# Patient Record
Sex: MALE | Race: WHITE | Employment: FULL TIME | URBAN - METROPOLITAN AREA
[De-identification: names, ages, dates, MRNs, and addresses within clinical notes are randomized per-mention and may not be internally consistent; named-entity substitution may affect disease eponyms.]

---

## 2018-12-04 ENCOUNTER — TRANSFERRED RECORDS (OUTPATIENT)
Dept: HEALTH INFORMATION MANAGEMENT | Facility: CLINIC | Age: 28
End: 2018-12-04

## 2019-02-20 NOTE — PROGRESS NOTES
SUBJECTIVE:   Mina Bar is a 29 year old male who presents to clinic today for the following health issues:      New Patient/Transfer of Care    Abnormal Mood Symptoms      Duration: on going 1+ year    Description:  Depression: no   Anxiety: YES  Panic attacks: YES - has had them. But not often    Accompanying signs and symptoms: see PHQ-9 and JEAN-PIERRE scores    History (similar episodes/previous evaluation): None    Precipitating or alleviating factors: None    Therapies tried and outcome: has seen someone before (5-6times) at [facility] 4/5 months ago but it wasn't working out well.     Patient states he was diagnosed with anxiety about a year or so ago. Was being seen through [facility] in [place]. Did not feel therapy was helpful with this provider. Was referred by a friend to our therapist Amberly Lu. He needed a PCP to refer him and this is the primary reason for his appointment .    He states his anxiety is generalized. Sleeps is hit or miss. Some night little to none, others is fine. Has frequent bad dreams. Denies history of trauma or abuse today. Appetite is okay. Some panic type symptoms, happens often. Has never been on medications. Open to this idea, but primarily looking for therapy.     Pt drinks several beers a week. No other drugs. Does not smoke. FH of anxiety is his mother, but he is not sure if she has been diagnosed. No other FH of mental health issues like bipolar disorder, etc.       Problem list and histories reviewed & adjusted, as indicated.  Additional history: as documented    Labs reviewed in EPIC    Reviewed and updated as needed this visit by clinical staff  Tobacco  Allergies  Meds  Problems  Med Hx  Surg Hx  Fam Hx  Soc Hx        Reviewed and updated as needed this visit by Provider  Tobacco  Allergies  Meds  Problems  Med Hx  Surg Hx  Fam Hx         ROS:  Constitutional, HEENT, cardiovascular, pulmonary, gi and gu systems are negative, except as otherwise  "noted.    OBJECTIVE:     /74   Pulse 79   Temp 97.9  F (36.6  C) (Tympanic)   Resp 14   Ht 1.854 m (6' 1\")   Wt 99.8 kg (220 lb)   SpO2 98%   BMI 29.03 kg/m    Body mass index is 29.03 kg/m .  GENERAL: healthy, alert and no distress  RESP: lungs clear to auscultation - no rales, rhonchi or wheezes  CV: regular rate and rhythm, normal S1 S2, no S3 or S4, no murmur, click or rub, no peripheral edema  NEURO: Normal strength and tone, mentation intact and speech normal  PSYCH: mentation appears normal, affect normal/bright    Diagnostic Test Results:    ASSESSMENT/PLAN:     JEAN-PIERRE (generalized anxiety disorder) / Panic disorder without agoraphobia  Patient has appt with therapy now set up. Labs today. He will consider medication and discuss further with therapist. Call or make appt to discuss further if he feels this is something he is interested in or needs.   - TSH with free T4 reflex  - Vitamin D Deficiency  - Vitamin B12    Radha Holland MD  [facility] [place]  "

## 2019-02-25 ENCOUNTER — DOCUMENTATION ONLY (OUTPATIENT)
Dept: PSYCHOLOGY | Facility: OTHER | Age: 29
End: 2019-02-25
Payer: COMMERCIAL

## 2019-02-25 DIAGNOSIS — Z53.9 NO SHOW: Primary | ICD-10-CM

## 2019-02-27 ENCOUNTER — OFFICE VISIT (OUTPATIENT)
Dept: FAMILY MEDICINE | Facility: OTHER | Age: 29
End: 2019-02-27
Attending: FAMILY MEDICINE
Payer: COMMERCIAL

## 2019-02-27 ENCOUNTER — OFFICE VISIT (OUTPATIENT)
Dept: BEHAVIORAL HEALTH | Facility: OTHER | Age: 29
End: 2019-02-27
Attending: SOCIAL WORKER
Payer: COMMERCIAL

## 2019-02-27 VITALS
HEIGHT: 73 IN | BODY MASS INDEX: 29.16 KG/M2 | SYSTOLIC BLOOD PRESSURE: 118 MMHG | HEART RATE: 79 BPM | WEIGHT: 220 LBS | TEMPERATURE: 97.9 F | RESPIRATION RATE: 14 BRPM | OXYGEN SATURATION: 98 % | DIASTOLIC BLOOD PRESSURE: 74 MMHG

## 2019-02-27 DIAGNOSIS — F41.1 GAD (GENERALIZED ANXIETY DISORDER): Primary | ICD-10-CM

## 2019-02-27 DIAGNOSIS — F41.0 PANIC DISORDER WITHOUT AGORAPHOBIA: ICD-10-CM

## 2019-02-27 DIAGNOSIS — R69 DIAGNOSIS DEFERRED: Primary | ICD-10-CM

## 2019-02-27 LAB
TSH SERPL DL<=0.005 MIU/L-ACNC: 1.42 MU/L (ref 0.4–4)
VIT B12 SERPL-MCNC: 381 PG/ML (ref 193–986)

## 2019-02-27 PROCEDURE — 99000 SPECIMEN HANDLING OFFICE-LAB: CPT | Performed by: FAMILY MEDICINE

## 2019-02-27 PROCEDURE — 36415 COLL VENOUS BLD VENIPUNCTURE: CPT | Performed by: FAMILY MEDICINE

## 2019-02-27 PROCEDURE — 99213 OFFICE O/P EST LOW 20 MIN: CPT | Performed by: FAMILY MEDICINE

## 2019-02-27 PROCEDURE — 82607 VITAMIN B-12: CPT | Mod: 90 | Performed by: FAMILY MEDICINE

## 2019-02-27 PROCEDURE — 82306 VITAMIN D 25 HYDROXY: CPT | Mod: 90 | Performed by: FAMILY MEDICINE

## 2019-02-27 PROCEDURE — 84443 ASSAY THYROID STIM HORMONE: CPT | Performed by: FAMILY MEDICINE

## 2019-02-27 PROCEDURE — 99207 ZZC NO CHARGE BEHAVIORAL WARM HANDOFF: CPT | Performed by: SOCIAL WORKER

## 2019-02-27 ASSESSMENT — PAIN SCALES - GENERAL: PAINLEVEL: NO PAIN (0)

## 2019-02-27 ASSESSMENT — MIFFLIN-ST. JEOR: SCORE: 2011.79

## 2019-02-27 NOTE — NURSING NOTE
"Chief Complaint   Patient presents with     Establish Care       Initial /74   Pulse 79   Temp 97.9  F (36.6  C) (Tympanic)   Resp 14   Ht 1.854 m (6' 1\")   Wt 99.8 kg (220 lb)   SpO2 98%   BMI 29.03 kg/m   Estimated body mass index is 29.03 kg/m  as calculated from the following:    Height as of this encounter: 1.854 m (6' 1\").    Weight as of this encounter: 99.8 kg (220 lb).  Medication Reconciliation: complete    Susan Mercer LPN  "

## 2019-02-27 NOTE — PROGRESS NOTES
BEHAVIORAL HEALTH CLINICIAN Warm-handoff note    February 27, 2019    BEHAVIORAL HEALTH CLINICIAN introduced and explained integrated health model, brief therapy interventions and referral and support services for ongoing therapy interventions.  Explained the role of the [facility] and provided informational handout and contact information for the [facility]. Presenting Issue: Anxiety.  States he saw a therapist previously to address his anxiety.  He states that the therapist was using mindfulness based coping skills which helped him minimally, was looking for other skills however therapist was not helping him very much with that.  He states that a friend had recently seen [facility] and recommended he come in to get connected.  Made a PCP appointment, as he has not established care with anyone in clinic.  He is willing to establish with Dr. Holland.  Scheduled intake appointment for next week.  Patient states that he rarely feels depressed, suicidality has never been a problem for him.    ANOOP Brown, LICSW, [facility]

## 2019-02-28 LAB — DEPRECATED CALCIDIOL+CALCIFEROL SERPL-MC: 8 UG/L (ref 20–75)

## 2019-03-01 DIAGNOSIS — E55.9 VITAMIN D DEFICIENCY: Primary | ICD-10-CM

## 2019-03-01 RX ORDER — ERGOCALCIFEROL 1.25 MG/1
50000 CAPSULE, LIQUID FILLED ORAL WEEKLY
Qty: 8 CAPSULE | Refills: 0 | Status: SHIPPED | OUTPATIENT
Start: 2019-03-01 | End: 2019-04-20

## 2019-03-15 ENCOUNTER — OFFICE VISIT (OUTPATIENT)
Dept: BEHAVIORAL HEALTH | Facility: OTHER | Age: 29
End: 2019-03-15
Attending: SOCIAL WORKER
Payer: COMMERCIAL

## 2019-03-15 DIAGNOSIS — F41.0 PANIC ATTACK: ICD-10-CM

## 2019-03-15 DIAGNOSIS — F43.21 ADJUSTMENT DISORDER WITH DEPRESSED MOOD: ICD-10-CM

## 2019-03-15 DIAGNOSIS — F41.1 GAD (GENERALIZED ANXIETY DISORDER): ICD-10-CM

## 2019-03-15 PROCEDURE — 90791 PSYCH DIAGNOSTIC EVALUATION: CPT | Performed by: SOCIAL WORKER

## 2019-03-15 ASSESSMENT — ANXIETY QUESTIONNAIRES
3. WORRYING TOO MUCH ABOUT DIFFERENT THINGS: MORE THAN HALF THE DAYS
6. BECOMING EASILY ANNOYED OR IRRITABLE: SEVERAL DAYS
7. FEELING AFRAID AS IF SOMETHING AWFUL MIGHT HAPPEN: SEVERAL DAYS
GAD7 TOTAL SCORE: 12
IF YOU CHECKED OFF ANY PROBLEMS ON THIS QUESTIONNAIRE, HOW DIFFICULT HAVE THESE PROBLEMS MADE IT FOR YOU TO DO YOUR WORK, TAKE CARE OF THINGS AT HOME, OR GET ALONG WITH OTHER PEOPLE: VERY DIFFICULT
1. FEELING NERVOUS, ANXIOUS, OR ON EDGE: MORE THAN HALF THE DAYS
5. BEING SO RESTLESS THAT IT IS HARD TO SIT STILL: MORE THAN HALF THE DAYS
2. NOT BEING ABLE TO STOP OR CONTROL WORRYING: MORE THAN HALF THE DAYS

## 2019-03-15 ASSESSMENT — PATIENT HEALTH QUESTIONNAIRE - PHQ9
5. POOR APPETITE OR OVEREATING: MORE THAN HALF THE DAYS
SUM OF ALL RESPONSES TO PHQ QUESTIONS 1-9: 13

## 2019-03-15 NOTE — PROGRESS NOTES
"Diagnostic Assessment     [facility]  Integrated Behavioral Health Services    Patient: Mina Bar MRN: [id number] ACCOUNT NUMBER: [id number]  : 1990   Age: 29 year old   Sex: male     Phone:  Home number on file: [phone] (home)    Work number on file:  There is no work phone number on file.    Cell number on file:       Telephone Information:   Mobile [phone]        Interview Date: 3/15/19   SERVICE LOCATION: Face to Face in Clinic  Visit Activities: NEW and [facility] Only    Identifying Information:  Patient is a 29 year old year old, , single male.  Patient attended the session alone. Patient presented as cooperative, calm, appropriate.    Referral:  Mina  was referred for an assessment by self.   Reason for referral: clarify behavioral health diagnosis, determine behavioral health treatment options and assess client readiness and motivation to change.     Patient's Statement of Presenting Concern & Functional impairments:  Mina reports the following reason(s) for seeking an assessment at this time: wants to address anxiety and lack of motivation symptoms.  When I get home, I just sit there and don't want to do anything.  \"I want to do things, but don't have any ambition to do it.\"  his symptoms have resulted in the following functional impairments: childcare / parenting, management of the household and or completion of tasks, organization, relationship(s), self-care and social interactions    History of Presenting Concern:  Mina  reports that these problem(s) began for many years.  Was a loner, would come home from school and be with parents/ grandparents.  Didn't have many friends, was the \"popular loner.\"  The worse of the symptoms have started a year ago.  The increase in anhedonia started only a couple months ago. He also indicates that his anxiety always gets worse at night.  Will have panic attacks, has only happened at night, does not remember one " that has happened in the day time.  Also states that he feels affected by the seasonal changes and this time of year is always more difficult for him.  he has received the following mental health services in the past: counseling and physician / PCP.     Social History:  Mina  reports he grew up in [place], [place].  Moved here with family in .  Mina has two half sisters.  Doesn't have a relationship with either one of them.  Mina describes his childhood as good.  Grew up with mom, then moved in with dad off and on.  Was close with grandparents and cousins.  They were together a lot.  Grew up in a blended family.  Family was all they ever hung out with.  Mina describes his current relationships with his family of origin as good.  Both parents still living, were , got  for 10 years then re-.  Both grandfathers .  Still struggles with these losses.    Mina identifies his relationship status as: , currently has a girlfirend.  in ,  in .   Still able to talk with ex and have a civil relationship.  Current relationship with girlfriend is supportive and healthy.  Mina identifies his sexual orientation as: opposite sex   Mina denies sexual health concerns.     Mina reports having two children. Troy, 10 year old.  Mina, 9 year old- live with their mom in [place].  See's them every other weekend.  Can see them whenever he wants.      Mina describes the quantity/quality of his social relationships as minimal.  He has acquaintances, but doesn't get close with many.  Hangs out with a select group of people, namely his girlfriend and cousins.  Mina denies personal  experience.     Mina  has not been involved with the legal system.    Mina highest education level was high school graduate and currently in school- takes about two courses each semester for general AA.. Mina  did not identify any learning problems. There are no  ethnic, cultural or Mosque factors that may be relevant for therapy.     Mina lives alone.  In rental house.  Currently employed full time.  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    Mental Health History:  Mina reported the following biological family members or relatives with mental health issues: Mother experienced Anxiety.  he previously received the following mental health diagnosis: saw someone previously, but was never told what his diagnosis were.   Hospitalizations: None.  he is not currently receiving any mental health services    Chemical Health History:  Mina  reported the following biological family members or relatives with chemical health issues: Uncle reportedly used alcohol . he has not received chemical dependency treatment in the past. he is not currently receiving any chemical dependency treatment. he reports no problems as a result of their drinking / drug use.    CAGE: None of the patient's responses to the CAGE screening were positive / Negative CAGE score Based on the negative Cage-Aid score and clinical interview there  are not indications of drug or alcohol abuse.     Discussed the general effects of drugs and alcohol on health and well-being.    Client Reports:  Mina reports using alcohol 2 times per month and has 1 beers and mixed drinks at a time. Client first started drinking at age 20.  Mina denies using tobacco.  Mina denies using marijuana.  Mina denies using caffeine.  Trying to cut down caffeine, hasn't had any in a couple days.  Used to drink caffeine beverages every day.  Mina denies using street drugs.  Mina denies the non-medical use of prescription or over the counter drugs.    Significant Losses / Trauma / Abuse / Neglect Issues / Developmental Incidents:  Mina reports significant loss/trauma/abuse/neglect issues/developmental incidents.  Mina reports death of grandparents who both  6 months ago only a  few weeks apart from each other.  First one  sept 1 second one  sept 26.  Cousin also  4-5 years, but thinks about that daily.   due to cancer at 29.   Mina has not addressed the above concerns in previous therapy/treatment.    Patient's Strengths and Limitations:   Mina identified the following strengths or resources that will help him cope: caring, committed to sobriety, employed, goal-focused, good listener, has a previous history of therapy, open to learning, open to suggestions / feedback, responsible parent, support of family, friends and providers, supportive, wants to learn and willing to relate to others  commitment to health and well being, friends / good social support and family support. Things that may interfere with the patient's functional status include:none indicated.    Medical History:   There is no problem list on file for this patient.      Medication Review:  Current Outpatient Medications   Medication     vitamin D2 (ERGOCALCIFEROL) 93274 units (1250 mcg) capsule     No current facility-administered medications for this visit.        Patient was provided recommendation to follow-up with physician as needed.    Medication Adherence:  N/A - Client does not have prescribed psychiatric medications.    Clinical Findings    Mental Status Assessment:    Appearance:   Appropriate   Eye Contact:   Good   Psychomotor Behavior: Normal   Attitude:   Cooperative   Orientation:   All  Speech   Rate / Production: Normal    Volume:  Normal   Mood:    Anxious  Normal  Affect:    Appropriate   Thought Content:  Clear   Thought Form:  Coherent  Logical   Insight:    Good     These cognitive functions grossly appear as described, but were not formally tested.    Review of Symptoms:  Depression: Sleep Interest Energy Concentration Appetite Ruminations  Arpita:  No symptoms  Psychosis: No symptoms  Anxiety: Worries  Panic:  Tremors Shortness of Breath Sense of Impending Doom pacing clamy  hot  Post Traumatic Stress Disorder: No symptoms  Obsessive Compulsive Disorder: No symptoms  Eating Disorder: No symptoms    Safety Issues and Plan for Safety and Risk Management:  Patient denies a history of suicidal ideation, suicide attempts, self-injurious behavior, homicidal ideation, homicidal behavior and and other safety concerns  Patient denies current fears or concerns for personal safety.  Patient denies current or recent suicidal ideation or behaviors.  Patient denies current or recent homicidal ideation or behaviors.  Patient denies current or recent self injurious behavior or ideation.  Patient denies other safety concerns.  Patient reports there are firearms in the house. The firearms are secured in a locked space  Protective Factors Children in the home , Sense of responsibility to family, Life Satisfaction, Reality testing ability, Positive coping skills, Positive problem-solving skills, Positive social support and Positive therapeutic releationships   A safety and risk management plan has not been developed at this time, however client was given the after-hours number / 911 should there be a change in any of these risk factors.    Diagnostic Criteria:  A. Excessive anxiety and worry about a number of events or activities (such as work or school performance).   B. The person finds it difficult to control the worry.  C. Select 3 or more symptoms (required for diagnosis). Only one item is required in children.   - Restlessness or feeling keyed up or on edge.    - Being easily fatigued.    - Difficulty concentrating or mind going blank.    - Sleep disturbance (difficulty falling or staying asleep, or restless unsatisfying sleep).   D. The focus of the anxiety and worry is not confined to features of an Axis I disorder.  E. The anxiety, worry, or physical symptoms cause clinically significant distress or impairment in social, occupational, or other important areas of functioning.   F. The disturbance  is not due to the direct physiological effects of a substance (e.g., a drug of abuse, a medication) or a general medical condition (e.g., hyperthyroidism) and does not occur exclusively during a Mood Disorder, a Psychotic Disorder, or a Pervasive Developmental Disorder.  1. Recurrent unexpected panic attacks and meets criteria 2, 3, and 4 (below)  2. At least one of the attacks has been followed by 1 month (or more) of one (or more) of the following:     (a) persistent concern about having additional attacks     (c) a significant change in behavior related to the attacks  3. Absence of agoraphobia  4. The panic attacks are not to the the direct physiological effects of a substance or general medical condition  5. The panic attacks are not better accounted for by another mental disorder, such as social phobia, specific phobia, OCD, PTSD, or separation anxiety disorder  A. The development of emotional or behavioral symptoms in response to an identifiable stressor(s) occurring within 3 months of the onset of the stressor(s)  B. These symptoms or behaviors are clinically significant, as evidenced by one or both of the following:       - Marked distress that is out of proportion to the severity/intensity of the stressor (with consideration for external context & culture)  C. The stress-related disturbance does not meet criteria for another disorder & is not not an exacerbation of another mental disorder  D. The symptoms do not represent normal bereavement  E. Once the stressor or its consequences have terminated, the symptoms do not persist for more than an additional 6 months       * Adjustment Disorder with Mixed Anxiety and Depressed Mood: The predominant manifestation is a combination of depression and anxiety    DSM5 Diagnoses: (Sustained by DSM5 Criteria Listed Above)  Behavioral and Medical Diagnosis: 300.01 (F41.0) Panic Disorder  300.02 (F41.1) Generalized Anxiety Disorder  Adjustment Disorders  309.0 (F43.21)  With depressed mood  Psychosocial & Contextual Factors: limited social support, mental health symptoms and occupational / vocational stress  WHODAS 2.0 SCORE:   WHODAS 2.0 Total Score 3/15/2019   Total Score 17     S1 Standing for long periods such as 30 minutes? None =         1   S2 Taking care of household responsibilities? Mild =           2   S3 Learning a new task, for example, learning how to get to a new place? None =         1   S4 How much of a problem do you have joining community activities (for example, festivals, Uatsdin or other activities) in the same way as anyone else can? None =         1   S5 How much have you been emotionally affected by your health problems? Moderate =   3     In the past 30 days, how much difficulty did you have in:   S6 Concentrating on doing something for ten minutes? Mild =           2   S7 Walking a long distance such as a kilometer (or equivalent)? None =         1   S8 Washing your whole body? None =         1   S9 Getting dressed? None =         1   S10 Dealing with people you do not know? None =         1   S11 Maintaining a friendship? None =         1   S12 Your day to day work? Mild =           2     H1 Overall, in the past 30 days, how many days were these difficulties present? Record number of days 25   H2 In the past 30 days, for how many days were you totally unable to carry out your usual activities or work because of any health condition? Record number of days  15   H3 In the past 30 days, not counting the days that you were totally unable, for how many days did you cut back or reduce your usual activities or work because of any health condition? Record number of days 15   See Media section of EPIC medical record for completed WHODAS  Preliminary Treatment Plan:    The client reports no currently identified Uatsdin, ethnic or cultural issues relevant to therapy.    As a preliminary treatment goal, patient will experience a reduction in depressed mood, will  develop more effective coping skills to manage depressive symptoms, will develop healthy cognitive patterns and beliefs and will increase ability to function adaptively, will experience a reduction in anxiety, will develop more effective coping skills to manage anxiety symptoms, will develop healthy cognitive patterns and beliefs and will increase ability to function adaptively and will develop coping/problem-solving skills to facilitate more adaptive adjustment.    Chemical dependency recommendations: No indications of CD issues    The focus of initial interventions will be to increase coping skills, provide psychoeduction regarding depression, anxiety and grief / loss, reduce panic attacks, teach CBT skills, teach DBT skills, teach emotional regulation, teach mindfulness skills, teach relaxation strategies, teach sleep hygiene and teach stress mangement techniques.    The concerns identified by the client will be addressed in therapy.    The client is receiving treatment / structured support from the following professional(s) / service and treatment. Collaboration will be initiated with: primary care physician.    Referral to another professional/service is not indicated at this time..      A Release of Information has been obtained for the following: Electronic ROSCOE and ROSCOE to [facility] to request DA and notes from previous therapist..    Report to child or adult protection services was NA.    Because the information provided for this DA is based on self report, the potential for misrepresentation of symptoms, life history, or diagnosis is possible.  If additional information becomes available, a DA update, addendum or further psychological testing may be appropriate.    Due to the clinical severity of the symptoms reported by the patient, functional impairments exist that significantly disrupt functioning.  The patient reports these symptoms negatively impact their quality of life.  Without the recommended  medically necessary treatments indicated, the client's symptoms are likely to increase in severity and functioning may further decline.    Amberly Lu [facility], Behavioral Health Clinician

## 2019-03-16 ASSESSMENT — ANXIETY QUESTIONNAIRES: GAD7 TOTAL SCORE: 12

## 2019-03-21 ENCOUNTER — OFFICE VISIT (OUTPATIENT)
Dept: BEHAVIORAL HEALTH | Facility: OTHER | Age: 29
End: 2019-03-21
Attending: SOCIAL WORKER
Payer: COMMERCIAL

## 2019-03-21 DIAGNOSIS — F41.1 GAD (GENERALIZED ANXIETY DISORDER): Primary | ICD-10-CM

## 2019-03-21 PROCEDURE — 90834 PSYTX W PT 45 MINUTES: CPT | Performed by: SOCIAL WORKER

## 2019-03-22 ASSESSMENT — ANXIETY QUESTIONNAIRES
2. NOT BEING ABLE TO STOP OR CONTROL WORRYING: MORE THAN HALF THE DAYS
1. FEELING NERVOUS, ANXIOUS, OR ON EDGE: MORE THAN HALF THE DAYS
5. BEING SO RESTLESS THAT IT IS HARD TO SIT STILL: MORE THAN HALF THE DAYS
7. FEELING AFRAID AS IF SOMETHING AWFUL MIGHT HAPPEN: SEVERAL DAYS
3. WORRYING TOO MUCH ABOUT DIFFERENT THINGS: NEARLY EVERY DAY
IF YOU CHECKED OFF ANY PROBLEMS ON THIS QUESTIONNAIRE, HOW DIFFICULT HAVE THESE PROBLEMS MADE IT FOR YOU TO DO YOUR WORK, TAKE CARE OF THINGS AT HOME, OR GET ALONG WITH OTHER PEOPLE: SOMEWHAT DIFFICULT
6. BECOMING EASILY ANNOYED OR IRRITABLE: MORE THAN HALF THE DAYS
GAD7 TOTAL SCORE: 14

## 2019-03-22 ASSESSMENT — PATIENT HEALTH QUESTIONNAIRE - PHQ9
5. POOR APPETITE OR OVEREATING: MORE THAN HALF THE DAYS
SUM OF ALL RESPONSES TO PHQ QUESTIONS 1-9: 15

## 2019-03-22 NOTE — PROGRESS NOTES
[facility]  March 21, 2019    Behavioral Health Clinician Progress Note    Patient Name: Mina Bar         Service Type: Individual           Service Location:  Face to Face in Clinic      Session Start Time:  4:00  Session End Time: 4:45      Session Length: 38 - 52      Attendees: Patient    Visit Activities (Refresh list every visit): [facility] Only      Diagnostic Assessment Date: 3/15/19  Treatment Plan Review Date: To be completed in next 3 visits    Previous PHQ-9:   PHQ 3/15/2019 3/22/2019   PHQ-9 Total Score 13 15   Q9: Suicide Ideation Not at all Not at all     Previous JEAN-PIERRE-7:   JEAN-PIERRE-7 SCORE 3/15/2019 3/22/2019   Total Score 12 14     In the past two weeks have you had thoughts of suicide or self-harm?  No.    Do you have concerns about your personal safety or the safety of others?   No  Suicide Assessment Five-step Evaluation and Treatment (SAFE-T)  MYRON LEVEL:  No flowsheet data found.    DATA  Extended Session (60+ minutes): No  Interactive Complexity: No  Crisis: No  [facility] Patient No    Treatment Objective(s) Addressed in This Session:  Target Behavior(s):  Depressed Mood: Increase interest, engagement, and pleasure in doing things  Decrease frequency and intensity of feeling down, depressed, hopeless  Improve quantity and quality of night time sleep / decrease daytime naps  Feel less tired and more energy during the day   Improve diet, appetite, mindful eating, and / or meal planning  Identify negative self-talk and behaviors: challenge core beliefs, myths, and actions  Improve concentration, focus, and mindfulness in daily activities   Anxiety: will experience a reduction in anxiety, will develop more effective coping skills to manage anxiety symptoms, will develop healthy cognitive patterns and beliefs and will increase ability to function adaptively    Current Stressors / Issues:  Increased depression and anxiety.  Relationship stressors which he feels are the trigger to much  of his anxiety and depression.    Mina  reported that he would like to use alpha-stim Cranial Electrotherapy Stimulation (BRANDAN) during therapy session today.  he gave verbal consent to use Alpha- Stim  as a tx modality.  Discussed any false beliefs, myths, concerns about the use.  Facilitated a brief introduction with patient.  Reviewed potential for dizzy or nausea feeling.   he expressed understanding and in agreement with continued use.  Mina  did not have this response, but was able to tolerate treatment with no complications. he completed a 20 minute treatment session at 300 microampere (?A) current.  he reports relief.    Coping skills:  Breathing, thought reframe.  Provided psychoeducation on healthy relationships and healthy communication patterns.    Progress on Treatment Objective(s) / Homework:  New Objective established this session - PREPARATION (Decided to change - considering how); Intervened by negotiating a change plan and determining options / strategies for behavior change, identifying triggers, exploring social supports, and working towards setting a date to begin behavior change    Motivational Interviewing    MI Intervention: Expressed Empathy/Understanding, Supported Autonomy, Collaboration, Evocation, Open-ended questions, Reflections: simple and complex and Change talk (evoked)     Change Talk Expressed by the Patient: Desire to change Ability to change Reasons to change Need to change    Provider Response to Change Talk: E - Evoked more info from patient about behavior change, A - Affirmed patient's thoughts, decisions, or attempts at behavior change, R - Reflected patient's change talk and S - Summarized patient's change talk statements      SOLUTION FOCUSED: Explored patterns in patient's relationships and discussed options for new behaviors, Explored patterns in patient's actions and choices and discussed options for new behaviors    Care Plan review completed: No    Medication  Review:  No current psychiatric medications prescribed    Medication Compliance:  NA    Changes in Health Issues:   None reported    Chemical Use Review:   Substance Use: Chemical use reviewed, no active concerns identified      Tobacco Use: No current tobacco use.      Assessment: Current Emotional / Mental Status (status of significant symptoms):    Risk status (Self / Other harm or suicidal ideation)  Patient denies current fears or concerns for personal safety.  Patient denies current or recent suicidal ideation or behaviors.  Patient denies current or recent homicidal ideation or behaviors.  Patient denies current or recent self injurious behavior or ideation.  Patient denies other safety concerns.  A safety and risk management plan has not been developed at this time, however patient was encouraged to call [place] [phone] should there be a change in any of these risk factors.    Appearance:   Appropriate   Eye Contact:   Good   Psychomotor Behavior: Normal   Attitude:   Cooperative   Orientation:   All  Speech   Rate / Production: Normal    Volume:  Normal   Mood:    Anxious   Affect:    Appropriate   Thought Content:  Clear   Thought Form:  Coherent  Logical   Insight:    Good     Diagnoses:    1. JEAN-PIERRE (generalized anxiety disorder)        Collateral Reports Completed:  Not Applicable    Plan: (Homework, other):  Patient was given information about behavioral services and encouraged to schedule a follow up appointment with the clinic [facility] in 1 week.  He was also given Cognitive Behavioral Therapy skills to practice when experiencing anxiety and depression.  CD Recommendations: No indications of CD issues.      Amberly Lu [facility], [facility]

## 2019-03-23 ASSESSMENT — ANXIETY QUESTIONNAIRES: GAD7 TOTAL SCORE: 14

## 2019-03-29 ENCOUNTER — TELEPHONE (OUTPATIENT)
Dept: FAMILY MEDICINE | Facility: OTHER | Age: 29
End: 2019-03-29

## 2019-03-29 ENCOUNTER — OFFICE VISIT (OUTPATIENT)
Dept: BEHAVIORAL HEALTH | Facility: OTHER | Age: 29
End: 2019-03-29
Attending: SOCIAL WORKER
Payer: COMMERCIAL

## 2019-03-29 DIAGNOSIS — F41.1 GAD (GENERALIZED ANXIETY DISORDER): Primary | ICD-10-CM

## 2019-03-29 PROCEDURE — 90832 PSYTX W PT 30 MINUTES: CPT | Performed by: SOCIAL WORKER

## 2019-03-29 RX ORDER — CITALOPRAM HYDROBROMIDE 10 MG/1
10 TABLET ORAL DAILY
Qty: 30 TABLET | Refills: 1 | Status: SHIPPED | OUTPATIENT
Start: 2019-03-29 | End: 2019-07-01

## 2019-03-29 ASSESSMENT — PATIENT HEALTH QUESTIONNAIRE - PHQ9
SUM OF ALL RESPONSES TO PHQ QUESTIONS 1-9: 12
5. POOR APPETITE OR OVEREATING: MORE THAN HALF THE DAYS

## 2019-03-29 ASSESSMENT — ANXIETY QUESTIONNAIRES
5. BEING SO RESTLESS THAT IT IS HARD TO SIT STILL: MORE THAN HALF THE DAYS
1. FEELING NERVOUS, ANXIOUS, OR ON EDGE: MORE THAN HALF THE DAYS
3. WORRYING TOO MUCH ABOUT DIFFERENT THINGS: MORE THAN HALF THE DAYS
GAD7 TOTAL SCORE: 12
2. NOT BEING ABLE TO STOP OR CONTROL WORRYING: MORE THAN HALF THE DAYS
6. BECOMING EASILY ANNOYED OR IRRITABLE: SEVERAL DAYS
7. FEELING AFRAID AS IF SOMETHING AWFUL MIGHT HAPPEN: SEVERAL DAYS
IF YOU CHECKED OFF ANY PROBLEMS ON THIS QUESTIONNAIRE, HOW DIFFICULT HAVE THESE PROBLEMS MADE IT FOR YOU TO DO YOUR WORK, TAKE CARE OF THINGS AT HOME, OR GET ALONG WITH OTHER PEOPLE: VERY DIFFICULT

## 2019-03-29 NOTE — PROGRESS NOTES
[facility]  March 29, 2019    Behavioral Health Clinician Progress Note    Patient Name: Mina Bar         Service Type: Individual           Service Location:  Face to Face in Clinic      Session Start Time:  9:00  Session End Time: 9:30      Session Length: 16 - 37      Attendees: Patient    Visit Activities (Refresh list every visit): [facility] [place] Only      Diagnostic Assessment Date: 3/15/19  Treatment Plan Review Date: To be completed in next 2 visits    Previous PHQ-9:   PHQ 3/15/2019 3/22/2019 3/29/2019   PHQ-9 Total Score 13 15 12   Q9: Suicide Ideation Not at all Not at all Not at all     Previous JEAN-PIERRE-7:   JEAN-PIERRE-7 SCORE 3/15/2019 3/22/2019 3/29/2019   Total Score 12 14 12     In the past two weeks have you had thoughts of suicide or self-harm?  No.    Do you have concerns about your personal safety or the safety of others?   No  Suicide Assessment Five-step Evaluation and Treatment (SAFE-T)  MYRON LEVEL:  No flowsheet data found.    DATA  Extended Session (60+ minutes): No  Interactive Complexity: No  Crisis: No  [facility] Patient No    Treatment Objective(s) Addressed in This Session:  Target Behavior(s):  Depressed Mood: Increase interest, engagement, and pleasure in doing things  Decrease frequency and intensity of feeling down, depressed, hopeless  Improve quantity and quality of night time sleep / decrease daytime naps  Feel less tired and more energy during the day   Improve diet, appetite, mindful eating, and / or meal planning  Identify negative self-talk and behaviors: challenge core beliefs, myths, and actions  Improve concentration, focus, and mindfulness in daily activities   Anxiety: will experience a reduction in anxiety, will develop more effective coping skills to manage anxiety symptoms, will develop healthy cognitive patterns and beliefs and will increase ability to function adaptively    Current Stressors / Issues:  Continues with depression and anxiety symptoms.  Reports  nothing changes, every day feels like the same day.  Continued relationship stressors.      Mina  reported that he would like to use alpha-stim Cranial Electrotherapy Stimulation (BRANDAN) during therapy session today.  He completed a 20 minute treatment session at 300 microampere (?A) current.  he reports relief.    Coping skills:  Breathing, thought reframe, healthy relationships and healthy communication patterns.      Progress on Treatment Objective(s) / Homework:  New Objective established this session - PREPARATION (Decided to change - considering how); Intervened by negotiating a change plan and determining options / strategies for behavior change, identifying triggers, exploring social supports, and working towards setting a date to begin behavior change    Motivational Interviewing    MI Intervention: Expressed Empathy/Understanding, Supported Autonomy, Collaboration, Evocation, Open-ended questions, Reflections: simple and complex and Change talk (evoked)     Change Talk Expressed by the Patient: Desire to change Ability to change Reasons to change Need to change    Provider Response to Change Talk: E - Evoked more info from patient about behavior change, A - Affirmed patient's thoughts, decisions, or attempts at behavior change, R - Reflected patient's change talk and S - Summarized patient's change talk statements    SOLUTION FOCUSED: Explored patterns in patient's relationships and discussed options for new behaviors, Explored patterns in patient's actions and choices and discussed options for new behaviors    Care Plan review completed: No    Medication Review:  No current psychiatric medications prescribed.  Patient is interested in starting a medication.  Will route note and inquire PCP regarding sending a prescription in for him.    Medication Compliance:  NA    Changes in Health Issues:   None reported    Chemical Use Review:   Substance Use: Chemical use reviewed, no active concerns identified       Tobacco Use: No current tobacco use.      Assessment: Current Emotional / Mental Status (status of significant symptoms):    Risk status (Self / Other harm or suicidal ideation)  Patient denies current fears or concerns for personal safety.  Patient denies current or recent suicidal ideation or behaviors.  Patient denies current or recent homicidal ideation or behaviors.  Patient denies current or recent self injurious behavior or ideation.  Patient denies other safety concerns.  A safety and risk management plan has not been developed at this time, however patient was encouraged to call [place] [phone] should there be a change in any of these risk factors.    Appearance:   Appropriate   Eye Contact:   Good   Psychomotor Behavior: Normal   Attitude:   Cooperative   Orientation:   All  Speech   Rate / Production: Normal    Volume:  Normal   Mood:    Anxious  Depressed   Affect:    Appropriate   Thought Content:  Clear   Thought Form:  Coherent  Logical   Insight:    Good     Diagnoses:  1. JEAN-PIERRE (generalized anxiety disorder)        Collateral Reports Completed:  Routed note to PCP    Plan: (Homework, other):  Patient was given information about behavioral services and encouraged to schedule a follow up appointment with the clinic [facility] in 1 week.  He was also given Cognitive Behavioral Therapy skills to practice when experiencing anxiety and depression.  CD Recommendations: No indications of CD issues.      Amberly Lu, [facility], [facility]

## 2019-03-29 NOTE — TELEPHONE ENCOUNTER
Received note from pts therapist. Considering medication. Would be open to calling in script for patient. Please call and see if he has strong feelings for or against any particular antidepressant medications. If not, I can choose for him. Will need to see him back in clinic in a month for a med check if started.   Radha Holland MD

## 2019-03-29 NOTE — TELEPHONE ENCOUNTER
Please call and let donna know that I did call in celexa for him to start for mood. He may cut in half for the first week. May cause some gi upset, this typically resolves over a week or two. If other side effects call. Please help set up appt in 1 month  For med check.

## 2019-03-30 ASSESSMENT — ANXIETY QUESTIONNAIRES: GAD7 TOTAL SCORE: 12

## 2019-04-05 ENCOUNTER — OFFICE VISIT (OUTPATIENT)
Dept: BEHAVIORAL HEALTH | Facility: OTHER | Age: 29
End: 2019-04-05
Attending: SOCIAL WORKER
Payer: COMMERCIAL

## 2019-04-05 DIAGNOSIS — F43.21 ADJUSTMENT DISORDER WITH DEPRESSED MOOD: ICD-10-CM

## 2019-04-05 DIAGNOSIS — F41.1 GAD (GENERALIZED ANXIETY DISORDER): Primary | ICD-10-CM

## 2019-04-05 PROCEDURE — 90832 PSYTX W PT 30 MINUTES: CPT | Performed by: SOCIAL WORKER

## 2019-04-05 ASSESSMENT — ANXIETY QUESTIONNAIRES
7. FEELING AFRAID AS IF SOMETHING AWFUL MIGHT HAPPEN: SEVERAL DAYS
2. NOT BEING ABLE TO STOP OR CONTROL WORRYING: MORE THAN HALF THE DAYS
5. BEING SO RESTLESS THAT IT IS HARD TO SIT STILL: MORE THAN HALF THE DAYS
GAD7 TOTAL SCORE: 12
3. WORRYING TOO MUCH ABOUT DIFFERENT THINGS: MORE THAN HALF THE DAYS
IF YOU CHECKED OFF ANY PROBLEMS ON THIS QUESTIONNAIRE, HOW DIFFICULT HAVE THESE PROBLEMS MADE IT FOR YOU TO DO YOUR WORK, TAKE CARE OF THINGS AT HOME, OR GET ALONG WITH OTHER PEOPLE: SOMEWHAT DIFFICULT
6. BECOMING EASILY ANNOYED OR IRRITABLE: MORE THAN HALF THE DAYS
1. FEELING NERVOUS, ANXIOUS, OR ON EDGE: MORE THAN HALF THE DAYS

## 2019-04-05 ASSESSMENT — PATIENT HEALTH QUESTIONNAIRE - PHQ9
SUM OF ALL RESPONSES TO PHQ QUESTIONS 1-9: 13
5. POOR APPETITE OR OVEREATING: SEVERAL DAYS

## 2019-04-05 NOTE — PROGRESS NOTES
[facility]  April 5, 2019    Behavioral Health Clinician Progress Note    Patient Name: Mina Bar         Service Type: Individual           Service Location:  Face to Face in Clinic      Session Start Time:  9:00  Session End Time: 9:30      Session Length: 16 - 37      Attendees: Patient    Visit Activities (Refresh list every visit): [facility] Only      Diagnostic Assessment Date: 3/15/19  Treatment Plan Review Date: To be completed in next visit    Previous PHQ-9:   PHQ 3/22/2019 3/29/2019 4/5/2019   PHQ-9 Total Score 15 12 13   Q9: Thoughts of better off dead/self-harm past 2 weeks Not at all Not at all Not at all     Previous JEAN-PIERRE-7:   JEAN-PIERRE-7 SCORE 3/22/2019 3/29/2019 4/5/2019   Total Score 14 12 12     In the past two weeks have you had thoughts of suicide or self-harm?  No.    Do you have concerns about your personal safety or the safety of others?   No  Suicide Assessment Five-step Evaluation and Treatment (SAFE-T)  MYRON LEVEL:  No flowsheet data found.    DATA  Extended Session (60+ minutes): No  Interactive Complexity: No  Crisis: No  [facility] Patient No    Treatment Objective(s) Addressed in This Session:  Target Behavior(s):  Depressed Mood: Increase interest, engagement, and pleasure in doing things  Decrease frequency and intensity of feeling down, depressed, hopeless  Improve quantity and quality of night time sleep / decrease daytime naps  Feel less tired and more energy during the day   Improve diet, appetite, mindful eating, and / or meal planning  Identify negative self-talk and behaviors: challenge core beliefs, myths, and actions  Improve concentration, focus, and mindfulness in daily activities   Anxiety: will experience a reduction in anxiety, will develop more effective coping skills to manage anxiety symptoms, will develop healthy cognitive patterns and beliefs and will increase ability to function adaptively    Current Stressors / Issues:  Continues with depression and  anxiety symptoms.  Reports nothing changes, every day feels like the same day.  Relationship stressors have improved.  Has upcoming vacation to [place], discussed ways to use this to help engage some emotional response.    Mina  reported that he would like to use alpha-stim Cranial Electrotherapy Stimulation (BRANDAN) during therapy session today.  He completed a 20 minute treatment session at 300 microampere (?A) current.  he reports relief.    Coping skills:  Breathing, thought reframe, healthy relationships and healthy communication patterns.      Progress on Treatment Objective(s) / Homework:  New Objective established this session - PREPARATION (Decided to change - considering how); Intervened by negotiating a change plan and determining options / strategies for behavior change, identifying triggers, exploring social supports, and working towards setting a date to begin behavior change    Motivational Interviewing    MI Intervention: Expressed Empathy/Understanding, Supported Autonomy, Collaboration, Evocation, Open-ended questions, Reflections: simple and complex and Change talk (evoked)     Change Talk Expressed by the Patient: Desire to change Ability to change Reasons to change Need to change    Provider Response to Change Talk: E - Evoked more info from patient about behavior change, A - Affirmed patient's thoughts, decisions, or attempts at behavior change, R - Reflected patient's change talk and S - Summarized patient's change talk statements    SOLUTION FOCUSED: Explored patterns in patient's relationships and discussed options for new behaviors, Explored patterns in patient's actions and choices and discussed options for new behaviors    Care Plan review completed: No    Medication Review:  Changes to psychiatric medications, see updated Medication List in EPIC. Did not get message from nurse regarding med at pharmacy.  Provided update, patient will  and start today.    Medication  Compliance:  NA    Changes in Health Issues:   None reported    Chemical Use Review:   Substance Use: Chemical use reviewed, no active concerns identified      Tobacco Use: No current tobacco use.      Assessment: Current Emotional / Mental Status (status of significant symptoms):    Risk status (Self / Other harm or suicidal ideation)  Patient denies current fears or concerns for personal safety.  Patient denies current or recent suicidal ideation or behaviors.  Patient denies current or recent homicidal ideation or behaviors.  Patient denies current or recent self injurious behavior or ideation.  Patient denies other safety concerns.  A safety and risk management plan has not been developed at this time, however patient was encouraged to call [place] [phone] should there be a change in any of these risk factors.    Appearance:   Appropriate   Eye Contact:   Good   Psychomotor Behavior: Normal   Attitude:   Cooperative   Orientation:   All  Speech   Rate / Production: Normal    Volume:  Normal   Mood:    Anxious  Depressed   Affect:    Appropriate  Flat   Thought Content:  Clear   Thought Form:  Coherent  Logical   Insight:    Good     Diagnoses:  1. JEAN-PIERRE (generalized anxiety disorder)    2. Adjustment disorder with depressed mood        Collateral Reports Completed:  Routed note to PCP    Plan: (Homework, other):  Patient was given information about behavioral services and encouraged to schedule a follow up appointment with the clinic [facility] in 1 week.  He was also given Cognitive Behavioral Therapy skills to practice when experiencing anxiety and depression.  CD Recommendations: No indications of CD issues.      [facility]mberly Lu [facility], [facility]

## 2019-04-06 ASSESSMENT — ANXIETY QUESTIONNAIRES: GAD7 TOTAL SCORE: 12

## 2019-05-17 ENCOUNTER — OFFICE VISIT (OUTPATIENT)
Dept: BEHAVIORAL HEALTH | Facility: OTHER | Age: 29
End: 2019-05-17
Attending: SOCIAL WORKER
Payer: COMMERCIAL

## 2019-05-17 DIAGNOSIS — F41.1 GAD (GENERALIZED ANXIETY DISORDER): ICD-10-CM

## 2019-05-17 DIAGNOSIS — F43.21 ADJUSTMENT DISORDER WITH DEPRESSED MOOD: Primary | ICD-10-CM

## 2019-05-17 PROCEDURE — 90832 PSYTX W PT 30 MINUTES: CPT | Performed by: SOCIAL WORKER

## 2019-05-17 ASSESSMENT — ANXIETY QUESTIONNAIRES
7. FEELING AFRAID AS IF SOMETHING AWFUL MIGHT HAPPEN: MORE THAN HALF THE DAYS
1. FEELING NERVOUS, ANXIOUS, OR ON EDGE: NEARLY EVERY DAY
GAD7 TOTAL SCORE: 18
6. BECOMING EASILY ANNOYED OR IRRITABLE: MORE THAN HALF THE DAYS
2. NOT BEING ABLE TO STOP OR CONTROL WORRYING: NEARLY EVERY DAY
5. BEING SO RESTLESS THAT IT IS HARD TO SIT STILL: MORE THAN HALF THE DAYS
3. WORRYING TOO MUCH ABOUT DIFFERENT THINGS: NEARLY EVERY DAY

## 2019-05-17 ASSESSMENT — PATIENT HEALTH QUESTIONNAIRE - PHQ9
SUM OF ALL RESPONSES TO PHQ QUESTIONS 1-9: 19
5. POOR APPETITE OR OVEREATING: NEARLY EVERY DAY
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
## 2019-05-18 ASSESSMENT — ANXIETY QUESTIONNAIRES: GAD7 TOTAL SCORE: 18

## 2019-05-23 ENCOUNTER — OFFICE VISIT (OUTPATIENT)
Dept: BEHAVIORAL HEALTH | Facility: OTHER | Age: 29
End: 2019-05-23
Attending: SOCIAL WORKER
Payer: COMMERCIAL

## 2019-05-23 DIAGNOSIS — F41.1 GAD (GENERALIZED ANXIETY DISORDER): Primary | ICD-10-CM

## 2019-05-23 PROCEDURE — 90832 PSYTX W PT 30 MINUTES: CPT | Performed by: SOCIAL WORKER

## 2019-05-23 ASSESSMENT — ANXIETY QUESTIONNAIRES
GAD7 TOTAL SCORE: 13
6. BECOMING EASILY ANNOYED OR IRRITABLE: MORE THAN HALF THE DAYS
IF YOU CHECKED OFF ANY PROBLEMS ON THIS QUESTIONNAIRE, HOW DIFFICULT HAVE THESE PROBLEMS MADE IT FOR YOU TO DO YOUR WORK, TAKE CARE OF THINGS AT HOME, OR GET ALONG WITH OTHER PEOPLE: SOMEWHAT DIFFICULT
1. FEELING NERVOUS, ANXIOUS, OR ON EDGE: MORE THAN HALF THE DAYS
2. NOT BEING ABLE TO STOP OR CONTROL WORRYING: MORE THAN HALF THE DAYS
7. FEELING AFRAID AS IF SOMETHING AWFUL MIGHT HAPPEN: SEVERAL DAYS
3. WORRYING TOO MUCH ABOUT DIFFERENT THINGS: MORE THAN HALF THE DAYS
5. BEING SO RESTLESS THAT IT IS HARD TO SIT STILL: MORE THAN HALF THE DAYS

## 2019-05-23 ASSESSMENT — PATIENT HEALTH QUESTIONNAIRE - PHQ9
SUM OF ALL RESPONSES TO PHQ QUESTIONS 1-9: 16
5. POOR APPETITE OR OVEREATING: MORE THAN HALF THE DAYS
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
## 2019-05-24 ASSESSMENT — ANXIETY QUESTIONNAIRES: GAD7 TOTAL SCORE: 13

## 2019-06-11 ENCOUNTER — OFFICE VISIT (OUTPATIENT)
Dept: BEHAVIORAL HEALTH | Facility: OTHER | Age: 29
End: 2019-06-11
Attending: SOCIAL WORKER
Payer: COMMERCIAL

## 2019-06-11 DIAGNOSIS — F41.1 GAD (GENERALIZED ANXIETY DISORDER): Primary | ICD-10-CM

## 2019-06-11 PROCEDURE — 90832 PSYTX W PT 30 MINUTES: CPT | Performed by: SOCIAL WORKER

## 2019-06-11 ASSESSMENT — ANXIETY QUESTIONNAIRES
1. FEELING NERVOUS, ANXIOUS, OR ON EDGE: MORE THAN HALF THE DAYS
7. FEELING AFRAID AS IF SOMETHING AWFUL MIGHT HAPPEN: SEVERAL DAYS
5. BEING SO RESTLESS THAT IT IS HARD TO SIT STILL: MORE THAN HALF THE DAYS
GAD7 TOTAL SCORE: 10
6. BECOMING EASILY ANNOYED OR IRRITABLE: SEVERAL DAYS
3. WORRYING TOO MUCH ABOUT DIFFERENT THINGS: SEVERAL DAYS
2. NOT BEING ABLE TO STOP OR CONTROL WORRYING: MORE THAN HALF THE DAYS
IF YOU CHECKED OFF ANY PROBLEMS ON THIS QUESTIONNAIRE, HOW DIFFICULT HAVE THESE PROBLEMS MADE IT FOR YOU TO DO YOUR WORK, TAKE CARE OF THINGS AT HOME, OR GET ALONG WITH OTHER PEOPLE: SOMEWHAT DIFFICULT

## 2019-06-11 ASSESSMENT — PATIENT HEALTH QUESTIONNAIRE - PHQ9: 5. POOR APPETITE OR OVEREATING: SEVERAL DAYS

## 2019-06-12 ASSESSMENT — PATIENT HEALTH QUESTIONNAIRE - PHQ9: SUM OF ALL RESPONSES TO PHQ QUESTIONS 1-9: 12
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
## 2019-06-13 ASSESSMENT — ANXIETY QUESTIONNAIRES: GAD7 TOTAL SCORE: 10

## 2019-06-25 ENCOUNTER — OFFICE VISIT (OUTPATIENT)
Dept: BEHAVIORAL HEALTH | Facility: OTHER | Age: 29
End: 2019-06-25
Attending: SOCIAL WORKER
Payer: COMMERCIAL

## 2019-06-25 DIAGNOSIS — F41.1 GAD (GENERALIZED ANXIETY DISORDER): Primary | ICD-10-CM

## 2019-06-25 PROCEDURE — 90832 PSYTX W PT 30 MINUTES: CPT | Performed by: SOCIAL WORKER

## 2019-06-25 ASSESSMENT — PATIENT HEALTH QUESTIONNAIRE - PHQ9
SUM OF ALL RESPONSES TO PHQ QUESTIONS 1-9: 10
5. POOR APPETITE OR OVEREATING: SEVERAL DAYS

## 2019-06-25 ASSESSMENT — ANXIETY QUESTIONNAIRES
2. NOT BEING ABLE TO STOP OR CONTROL WORRYING: SEVERAL DAYS
IF YOU CHECKED OFF ANY PROBLEMS ON THIS QUESTIONNAIRE, HOW DIFFICULT HAVE THESE PROBLEMS MADE IT FOR YOU TO DO YOUR WORK, TAKE CARE OF THINGS AT HOME, OR GET ALONG WITH OTHER PEOPLE: SOMEWHAT DIFFICULT
1. FEELING NERVOUS, ANXIOUS, OR ON EDGE: MORE THAN HALF THE DAYS
3. WORRYING TOO MUCH ABOUT DIFFERENT THINGS: SEVERAL DAYS
GAD7 TOTAL SCORE: 9
7. FEELING AFRAID AS IF SOMETHING AWFUL MIGHT HAPPEN: SEVERAL DAYS
5. BEING SO RESTLESS THAT IT IS HARD TO SIT STILL: MORE THAN HALF THE DAYS
6. BECOMING EASILY ANNOYED OR IRRITABLE: SEVERAL DAYS

## 2019-06-26 ASSESSMENT — ANXIETY QUESTIONNAIRES: GAD7 TOTAL SCORE: 9
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
## 2019-07-01 ENCOUNTER — OFFICE VISIT (OUTPATIENT)
Dept: BEHAVIORAL HEALTH | Facility: OTHER | Age: 29
End: 2019-07-01
Attending: SOCIAL WORKER
Payer: COMMERCIAL

## 2019-07-01 DIAGNOSIS — F41.1 GAD (GENERALIZED ANXIETY DISORDER): Primary | ICD-10-CM

## 2019-07-01 DIAGNOSIS — F41.1 GAD (GENERALIZED ANXIETY DISORDER): ICD-10-CM

## 2019-07-01 PROCEDURE — 90832 PSYTX W PT 30 MINUTES: CPT | Performed by: SOCIAL WORKER

## 2019-07-01 ASSESSMENT — ANXIETY QUESTIONNAIRES
1. FEELING NERVOUS, ANXIOUS, OR ON EDGE: SEVERAL DAYS
GAD7 TOTAL SCORE: 7
2. NOT BEING ABLE TO STOP OR CONTROL WORRYING: SEVERAL DAYS
3. WORRYING TOO MUCH ABOUT DIFFERENT THINGS: SEVERAL DAYS
5. BEING SO RESTLESS THAT IT IS HARD TO SIT STILL: SEVERAL DAYS
IF YOU CHECKED OFF ANY PROBLEMS ON THIS QUESTIONNAIRE, HOW DIFFICULT HAVE THESE PROBLEMS MADE IT FOR YOU TO DO YOUR WORK, TAKE CARE OF THINGS AT HOME, OR GET ALONG WITH OTHER PEOPLE: SOMEWHAT DIFFICULT
7. FEELING AFRAID AS IF SOMETHING AWFUL MIGHT HAPPEN: SEVERAL DAYS
6. BECOMING EASILY ANNOYED OR IRRITABLE: SEVERAL DAYS

## 2019-07-01 ASSESSMENT — PATIENT HEALTH QUESTIONNAIRE - PHQ9
5. POOR APPETITE OR OVEREATING: SEVERAL DAYS
SUM OF ALL RESPONSES TO PHQ QUESTIONS 1-9: 10
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
## 2019-07-02 RX ORDER — CITALOPRAM HYDROBROMIDE 10 MG/1
TABLET ORAL
Qty: 90 TABLET | Refills: 1 | Status: SHIPPED | OUTPATIENT
Start: 2019-07-02

## 2019-07-02 ASSESSMENT — ANXIETY QUESTIONNAIRES: GAD7 TOTAL SCORE: 7

## 2019-07-17 ENCOUNTER — OFFICE VISIT (OUTPATIENT)
Dept: BEHAVIORAL HEALTH | Facility: OTHER | Age: 29
End: 2019-07-17
Attending: SOCIAL WORKER
Payer: COMMERCIAL

## 2019-07-17 DIAGNOSIS — F43.21 ADJUSTMENT DISORDER WITH DEPRESSED MOOD: Primary | ICD-10-CM

## 2019-07-17 PROCEDURE — 90832 PSYTX W PT 30 MINUTES: CPT | Performed by: SOCIAL WORKER

## 2019-07-17 ASSESSMENT — ANXIETY QUESTIONNAIRES
2. NOT BEING ABLE TO STOP OR CONTROL WORRYING: SEVERAL DAYS
GAD7 TOTAL SCORE: 7
IF YOU CHECKED OFF ANY PROBLEMS ON THIS QUESTIONNAIRE, HOW DIFFICULT HAVE THESE PROBLEMS MADE IT FOR YOU TO DO YOUR WORK, TAKE CARE OF THINGS AT HOME, OR GET ALONG WITH OTHER PEOPLE: SOMEWHAT DIFFICULT
6. BECOMING EASILY ANNOYED OR IRRITABLE: SEVERAL DAYS
7. FEELING AFRAID AS IF SOMETHING AWFUL MIGHT HAPPEN: SEVERAL DAYS
3. WORRYING TOO MUCH ABOUT DIFFERENT THINGS: SEVERAL DAYS
1. FEELING NERVOUS, ANXIOUS, OR ON EDGE: SEVERAL DAYS
5. BEING SO RESTLESS THAT IT IS HARD TO SIT STILL: SEVERAL DAYS

## 2019-07-17 ASSESSMENT — PATIENT HEALTH QUESTIONNAIRE - PHQ9
SUM OF ALL RESPONSES TO PHQ QUESTIONS 1-9: 9
5. POOR APPETITE OR OVEREATING: SEVERAL DAYS
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
## 2019-07-18 ASSESSMENT — ANXIETY QUESTIONNAIRES: GAD7 TOTAL SCORE: 7

## 2019-08-01 ENCOUNTER — OFFICE VISIT (OUTPATIENT)
Dept: BEHAVIORAL HEALTH | Facility: OTHER | Age: 29
End: 2019-08-01
Attending: SOCIAL WORKER
Payer: COMMERCIAL

## 2019-08-01 DIAGNOSIS — F41.1 GAD (GENERALIZED ANXIETY DISORDER): Primary | ICD-10-CM

## 2019-08-01 PROCEDURE — 90832 PSYTX W PT 30 MINUTES: CPT | Performed by: SOCIAL WORKER

## 2019-08-01 ASSESSMENT — ANXIETY QUESTIONNAIRES
IF YOU CHECKED OFF ANY PROBLEMS ON THIS QUESTIONNAIRE, HOW DIFFICULT HAVE THESE PROBLEMS MADE IT FOR YOU TO DO YOUR WORK, TAKE CARE OF THINGS AT HOME, OR GET ALONG WITH OTHER PEOPLE: SOMEWHAT DIFFICULT
2. NOT BEING ABLE TO STOP OR CONTROL WORRYING: SEVERAL DAYS
6. BECOMING EASILY ANNOYED OR IRRITABLE: SEVERAL DAYS
GAD7 TOTAL SCORE: 7
7. FEELING AFRAID AS IF SOMETHING AWFUL MIGHT HAPPEN: SEVERAL DAYS
5. BEING SO RESTLESS THAT IT IS HARD TO SIT STILL: SEVERAL DAYS
1. FEELING NERVOUS, ANXIOUS, OR ON EDGE: SEVERAL DAYS
3. WORRYING TOO MUCH ABOUT DIFFERENT THINGS: SEVERAL DAYS

## 2019-08-01 ASSESSMENT — PATIENT HEALTH QUESTIONNAIRE - PHQ9
SUM OF ALL RESPONSES TO PHQ QUESTIONS 1-9: 11
5. POOR APPETITE OR OVEREATING: SEVERAL DAYS
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
## 2019-08-02 ASSESSMENT — ANXIETY QUESTIONNAIRES: GAD7 TOTAL SCORE: 7

## 2020-03-11 ENCOUNTER — HEALTH MAINTENANCE LETTER (OUTPATIENT)
Age: 30
End: 2020-03-11

## 2021-01-03 ENCOUNTER — HEALTH MAINTENANCE LETTER (OUTPATIENT)
Age: 31
End: 2021-01-03

## 2021-04-25 ENCOUNTER — HEALTH MAINTENANCE LETTER (OUTPATIENT)
Age: 31
End: 2021-04-25

## 2021-10-09 ENCOUNTER — HEALTH MAINTENANCE LETTER (OUTPATIENT)
Age: 31
End: 2021-10-09

## 2022-05-21 ENCOUNTER — HEALTH MAINTENANCE LETTER (OUTPATIENT)
Age: 32
End: 2022-05-21

## 2022-09-17 ENCOUNTER — HEALTH MAINTENANCE LETTER (OUTPATIENT)
Age: 32
End: 2022-09-17

## 2023-06-04 ENCOUNTER — HEALTH MAINTENANCE LETTER (OUTPATIENT)
Age: 33
End: 2023-06-04